# Patient Record
Sex: FEMALE | Race: WHITE
[De-identification: names, ages, dates, MRNs, and addresses within clinical notes are randomized per-mention and may not be internally consistent; named-entity substitution may affect disease eponyms.]

---

## 2017-02-17 ENCOUNTER — HOSPITAL ENCOUNTER (EMERGENCY)
Dept: HOSPITAL 53 - M ED | Age: 22
Discharge: HOME | End: 2017-02-17
Payer: COMMERCIAL

## 2017-02-17 DIAGNOSIS — R09.1: Primary | ICD-10-CM

## 2017-02-17 LAB
ANION GAP SERPL CALC-SCNC: 7 MEQ/L (ref 8–16)
BASOPHILS # BLD AUTO: 0 K/MM3 (ref 0–0.2)
BASOPHILS NFR BLD AUTO: 0.3 % (ref 0–1)
BUN SERPL-MCNC: 9 MG/DL (ref 7–18)
CALCIUM SERPL-MCNC: 9.6 MG/DL (ref 8.5–10.1)
CHLORIDE SERPL-SCNC: 107 MEQ/L (ref 98–107)
CO2 SERPL-SCNC: 28 MEQ/L (ref 21–32)
CREAT SERPL-MCNC: 0.81 MG/DL (ref 0.55–1.02)
EOSINOPHIL # BLD AUTO: 0.2 K/MM3 (ref 0–0.5)
EOSINOPHIL NFR BLD AUTO: 1.6 % (ref 0–3)
ERYTHROCYTE [DISTWIDTH] IN BLOOD BY AUTOMATED COUNT: 12.8 % (ref 11.5–14.5)
GFR SERPL CREATININE-BSD FRML MDRD: > 60 ML/MIN/{1.73_M2} (ref 60–?)
GLUCOSE SERPL-MCNC: 89 MG/DL (ref 70–105)
INR PPP: 1.08
LARGE UNSTAINED CELL #: 0.1 K/MM3 (ref 0–0.4)
LARGE UNSTAINED CELL %: 1.5 % (ref 0–4)
LYMPHOCYTES # BLD AUTO: 2 K/MM3 (ref 1.5–6.5)
LYMPHOCYTES NFR BLD AUTO: 21.8 % (ref 24–44)
MCH RBC QN AUTO: 28.7 PG (ref 27–33)
MCHC RBC AUTO-ENTMCNC: 33.8 G/DL (ref 32–36.5)
MCV RBC AUTO: 85 FL (ref 80–96)
MONOCYTES # BLD AUTO: 0.6 K/MM3 (ref 0–0.8)
MONOCYTES NFR BLD AUTO: 6.3 % (ref 0–5)
NEUTROPHILS # BLD AUTO: 6.4 K/MM3 (ref 1.8–7.7)
NEUTROPHILS NFR BLD AUTO: 68.7 % (ref 36–66)
PLATELET # BLD AUTO: 176 K/MM3 (ref 150–450)
POTASSIUM SERPL-SCNC: 3.6 MEQ/L (ref 3.5–5.1)
SODIUM SERPL-SCNC: 142 MEQ/L (ref 136–145)
WBC # BLD AUTO: 9.3 K/MM3 (ref 4–10)

## 2017-02-17 NOTE — EDDOCDS
Physician Documentation                                                                           

[facility]                                                                         

Name: Jil Saunders                                                                              

Age: 21 yrs                                                                                       

Sex: Female                                                                                       

: 1995                                                                                   

MRN: [id number]                                                                                     

Arrival Date: 2017                                                                          

Time: 20:47                                                                                       

Account#: [id number]                                                                              

Bed I7 /                                                                                        

Private MD: Vitaly Muñiz W                                                                        

Disposition:                                                                                      

17 23:37 Discharged to Home/Self Care. Impression: Chest pain on breathing, Pleurisy.       

- Condition is Stable.                                                                            

- Discharge Instructions: Nonspecific Chest Pain, Pleurisy.                                       

- Prescriptions for Ibuprofen 600 mg Oral Tablet - take 1 tablet by ORAL route every 6            

hours As needed take with food; 30 tablet.                                                      

- Medication Reconciliation, Local Pharmacy Hours form.                                           

- Follow up: Vitaly Muñiz; When: 2 - 3 days; Reason: Recheck today's complaints,                   

Continuance of care.                                                                            

- Problem is new.                                                                                 

- Symptoms are unchanged.                                                                         

                                                                                                  

                                                                                                  

                                                                                                  

Historical:                                                                                       

- Allergies: no known allergies;                                                                  

- Home Meds:                                                                                      

1. none                                                                                         

- PMHx: none;                                                                                     

- PSHx: none;                                                                                     

- Social history: Smoking status: Patient states was never smoker of tobacco. No                  

barriers to communication noted, The patient speaks fluent English, Speaks                      

appropriately for age.                                                                          

- Family history: Not pertinent.                                                                  

- : The pt / caregiver states he / she is not on anticoagulants. Home medication list             

is obtained from the patient.                                                                   

- Exposure Risk Screening:: None identified.                                                      

                                                                                                  

                                                                                                  

OB/GYN:                                                                                           

                                                                                             

20:54 LMP 2017                                                                            kc3 

                                                                                                  

Vital Signs:                                                                                      

20:49  / 98; Pulse 93; Resp 18 S; Temp 98.3(O); Pulse Ox 100% on R/A; Weight 61.23 kg / gr2 

      134.99 lbs (R); Height 5 ft. 4 in. (162.56 cm) (R); Pain 7/10;                              

23:44  / 67; Pulse 80; Resp 16; Temp 98.2; Pulse Ox 98% on R/A; Pain 4/10;              ld5 

20:49 Body Mass Index 23.17 (61.23 kg, 162.56 cm)                                             gr2 

                                                                                                  

MDM:                                                                                              

21:42 Aspirin Chewable Tablet 324 mg PO once ordered.                                         ke  

21:42 IV Saline Lock ordered.                                                                 ke  

21:42 Undress patient appropriately for examination ordered.                                  ke  

21:43 Basic Metabolic Profile Ordered.                                                        EDMS

21:43 CBC with Diff Ordered.                                                                  EDMS

21:43 Cardiac Injury Profile Ordered.                                                         EDMS

21:43 D-Dimer Quant Ordered.                                                                  EDMS

21:43 Prothrombin Time Profile\E\INR Ordered.                                                   EDMS

21:43 Troponin Ordered.                                                                       EDMS

21:45 Chest, 2 View (pa\E\lat) Ordered.                                                         EDMS

21:45 ECG WITH READING ER PHYS+CARDIAG ordered.                                               EDMS

22:45 Financial registration complete.                                                        [email or web address] 

23:06 [facility] Payment Agreement was scanned into [employer] and attached to record.               [email or web address] 

23:33 Basic Metabolic Profile Reviewed.                                                       ke  

23:33 CBC with Diff Reviewed.                                                                 ke  

23:33 Cardiac Injury Profile Reviewed.                                                        ke  

23:33 D-Dimer Quant Reviewed.                                                                 ke  

23:33 Prothrombin Time Profile\E\INR Reviewed.                                                  ke

23:33 Troponin Reviewed.                                                                      ke  

                                                                                                  

Administered Medications:                                                                         

21:50 Drug: Aspirin 324 mg [aspirin 81 mg chewable tablet (4 tabs)] Route: PO;                [facility] 

                                                                                                  

                                                                                                  

Signatures:                                                                                       

Dispatcher [employer]                           EDMS                                                 

Jonathon Allison, MEENUP                       FNP  Hermila BoboRN                      RN   ld5                                                  

Beatriz Durham RN                          RN   [facility]                                                  

Rhiannon Winters,CORIE                          RN   davy3                                                  

Margi Escobedo                                                  

                                                                                                  

The chart was reviewed and I authenticate all verbal orders and agree with the evaluation and 
treatment provided.Attachments:

23:06 [facility] Payment Agreement                                                                neda 

                                                                                                  

**************************************************************************************************

MTDD

## 2017-02-17 NOTE — EDDOCDS
Nurse's Notes                                                                                     

[facility]                                                                         

Name: Jil Saunders                                                                              

Age: 21 yrs                                                                                       

Sex: Female                                                                                       

: 1995                                                                                   

MRN: [id number]                                                                                     

Arrival Date: 2017                                                                          

Time: 20:47                                                                                       

Account#: [id number]                                                                              

Bed I7 / 29                                                                                       

Private MD: Vitaly Muñiz W                                                                        

Diagnosis: Chest pain on breathing;Pleurisy                                                       

                                                                                                  

Presentation:                                                                                     

                                                                                             

20:51 Presenting complaint: Patient states: chest pain since noon today epigastric area       kc3 

      radiating to back. Pt denies n/v and shortness of breath. Aspirin was not taken prior       

      to arrival. Adult Sepsis Screening: The patient does not have new or worsening altered      

      mentation. Patient's respiratory rate is less than 22. Systolic blood pressure is           

      greater than 100. Patient has a qSOFA score of 0- Negative Sepsis Screen.                   

      Suicide/Homicide risk assessment- the patient denies having any suicidal and/or             

      homicidal ideations and does not present with any other emotional, behavioral or mental     

      health complaints.  Status: [employer]. Transition of care: patient was not     

      received from another setting of care.                                                      

20:51 Acuity: EARNEST Level 3                                                                     kc3 

20:51 Method Of Arrival: Walkin/Carried/Asstd                                                 kc3 

                                                                                                  

Triage Assessment:                                                                                

20:53 General: Appears in no apparent distress, comfortable, Behavior is appropriate for age, kc3 

      cooperative. Pain: Location: chest Pain currently is 6 out of 10 on a pain scale. Pt        

      Declines HIV testing. Cardiovascular: Chest pain is described as Pain is 6 out of 10 on     

      a pain scale. radiates back episodes are intermittent began noon today is aggravated by     

      breathing. Respiratory: Respiratory effort is even, unlabored, Denies shortness of          

      breath. GI: Denies nausea, vomiting. Derm: Skin is pink, warm & dry.                      

                                                                                                  

OB/GYN:                                                                                           

20:54 LMP 2017                                                                            kc3 

                                                                                                  

Historical:                                                                                       

- Allergies: no known allergies;                                                                  

- Home Meds:                                                                                      

1. none                                                                                         

- PMHx: none;                                                                                     

- PSHx: none;                                                                                     

- Social history: Smoking status: Patient states was never smoker of tobacco. No                  

barriers to communication noted, The patient speaks fluent English, Speaks                      

appropriately for age.                                                                          

- Family history: Not pertinent.                                                                  

- : The pt / caregiver states he / she is not on anticoagulants. Home medication list             

is obtained from the patient.                                                                   

- Exposure Risk Screening:: None identified.                                                      

                                                                                                  

                                                                                                  

Screenin:13 Screening information is obtained from the patient. Fall risk: No risks identified.     [facility] 

      Assistance ADL's: requires no assistance with activities of daily living. Abuse/DV          

      Screen: The patient / caregiver reports he/she is: not in a situation that causes fear,     

      pain or injury. Nutritional screening: No deficits noted. Advance Directives: There is      

      no active DNR order. home support is adequate.                                              

                                                                                                  

Assessment:                                                                                       

21:56 General: Appears in no apparent distress, comfortable, Behavior is appropriate for age, cjh 

      cooperative. Pain: Location: xyphoid area Pain currently is 6 out of 10 on a pain           

      scale. Pain does not radiate. Aggravated by laying down. Neurological: Level of             

      Consciousness is awake, alert, Oriented to person, place, time. Cardiovascular: Rhythm      

      is sinus rhythm Chest pain is located in epigastric area. Respiratory: Airway is patent     

      Respiratory effort is even, unlabored, Respiratory pattern is regular, symmetrical,         

      Breath sounds are clear bilaterally. Derm: Skin is pink, warm & dry.                      

22:15 General: resting quietly on stretcher, awaiting lab results, states pain improves with  [facility] 

      sitting upright, friend at bedside, will continue to monitor.                               

23:44 General: Appears in no apparent distress, Behavior is cooperative, Pt sitting up in bed ld5 

      playing on phone. Friend at bedside. Respiratory: Airway is patent Respiratory effort       

      is even, unlabored. GI: Denies nausea, vomiting.                                            

23:44 Pain: Location: chest Pain currently is 4 out of 10 on a pain scale.                    ld5 

                                                                                                  

Vital Signs:                                                                                      

20:49  / 98; Pulse 93; Resp 18 S; Temp 98.3(O); Pulse Ox 100% on R/A; Weight 61.23 kg   gr2 

      (R); Height 5 ft. 4 in. (162.56 cm) (R); Pain 7/10;                                         

23:44  / 67; Pulse 80; Resp 16; Temp 98.2; Pulse Ox 98% on R/A; Pain 4/10;              ld5 

20:49 Body Mass Index 23.17 (61.23 kg, 162.56 cm)                                             gr2 

                                                                                                  

Vitals:                                                                                           

20:49 Log In Time: 2017 at 20:49.                                                gr2 

                                                                                                  

ED Course:                                                                                        

20:48 Patient visited by Fatuma Wood.                                                   gr2 

20:48 Patient moved to Waiting                                                                gr2 

20:49 Vitaly Muñiz is Private Physician.                                                       gr2 

20:50 Patient visited by Fatuma Wood.                                                   gr2 

20:50 Patient moved to Pre RCE                                                                gr2 

20:52 Triage Initiated                                                                        kc3 

21:31 Patient moved to Triage 3                                                               cln 

21:34 [facility]                                                              ke  

21:34 Patient visited by Jonathon Allison FNP.                                                   ke  

21:34 Patient visited by Jonathon Allison FNP.                                                   ke  

21:44 Patient moved to I                                                                cz  

22:01 Patient visited by Bancroft, Kristopher, PCA.                                           kb5 

22:01 EKG done. (by ED staff). Reviewed by Jonathon VALADEZ.                                   kb5 

22:02 Basic Metabolic Profile Sent.                                                           rw1 

22:02 CBC with Diff Sent.                                                                     rw1 

22:02 Cardiac Injury Profile Sent.                                                            rw1 

22:02 D-Dimer Quant Sent.                                                                     rw1 

22:02 Prothrombin Time Profile\E\INR Sent.                                                      rw1

22:02 Troponin Sent.                                                                          rw1 

22:02 Inserted saline lock: 18 gauge in right antecubital area and blood collected. The       rw1 

      patient tolerated the procedure well.                                                       

22:13 The patient / caregiver is instructed regarding the plan of care and ED course. Cardiac [facility] 

      monitoring not applicable on this patient.                                                  

22:13 No procedures done that require assistance.                                             [facility] 

22:22 Patient visited by Jonathon Allison FNP.                                                   ke  

23:06 [facility] Payment Agreement was scanned into [email or web address] and attached to record.               gjb 

23:07 Patient visited by Bancroft, Kristopher, PCA.                                           kb5 

23:36 Vitaly Muñiz is Referral Physician.                                                      ke  

23:44 Discontinued lock intact, bleeding controlled, pressure dressing applied, No            ld5 

      redness/swelling at site.                                                                   

23:45 Patient visited by Hermila Keller RN.                                                  ld5 

                                                                                                  

Administered Medications:                                                                         

21:50 Drug: Aspirin 324 mg [aspirin 81 mg chewable tablet (4 tabs)] Route: PO;                [facility] 

                                                                                                  

                                                                                                  

Order Results:                                                                                    

Lab Order: Basic Metabolic Profile; SPEC'M 17 22:48                                         

      Test: GLUCOSE, FASTING; Value: 89; Range: ; Units: MG/DL; Status: F                   

      Test: BLOOD UREA NITROGEN; Value: 9; Range: 7-18; Units: MG/DL; Status: F                   

      Test: CREATININE FOR GFR; Value: 0.81; Range: 0.55-1.02; Units: MG/DL; Status: F            

      Test: GLOMERULAR FILTRATION RATE; Value: > 60.0; Range: >60; Status: F                      

      Test: SODIUM LEVEL; Value: 142; Range: 136-145; Units: MEQ/L; Status: F                     

      Test: POTASSIUM SERUM; Value: 3.6; Range: 3.5-5.1; Units: MEQ/L; Status: F                  

      Test: CHLORIDE LEVEL; Value: 107; Range: ; Units: MEQ/L; Status: F                    

      Test: CARBON DIOXIDE LEVEL; Value: 28; Range: 21-32; Units: MEQ/L; Status: F                

      Test: ANION GAP; Value: 7; Range: 8-16; Abnormal: Below low normal; Units: MEQ/L;           

      Status: F                                                                                   

      Test: CALCIUM LEVEL; Value: 9.6; Range: 8.5-10.1; Units: MG/DL; Status: F                   

      Test Note: &nbsp;; Units are mL/min/1.73 m2 Chronic Kidney Disease Staging per NKF:       

      Stage I & II GFR >=60 Normal to Mildly Decreased Stage III GFR 30-59 Moderately           

      Decreased Stage IV GFR 15-29 Severely Decreased Stage V GFR <15 Very Little GFR Left        

      ESRD GFR <15 on RRT                                                                         

Lab Order: CBC with Diff; SPEC'M 17 21:59                                                   

      Test: WHITE BLOOD COUNT; Value: 9.3; Range: 4.0-10.0; Units: K/mm3; Status: F               

      Test: RED BLOOD COUNT; Value: 4.85; Range: 4.00-5.40; Units: M/mm3; Status: F               

      Test: HEMOGLOBIN; Value: 14.0; Range: 12.0-16.0; Units: g/dl; Status: F                     

      Test: HEMATOCRIT; Value: 41.2; Range: 36.0-47.0; Units: %; Status: F                        

      Test: MEAN CORPUSCULAR VOLUME; Value: 85.0; Range: 80.0-96.0; Units: fl; Status: F          

      Test: MEAN CORPUSCULAR HEMOGLOBIN; Value: 28.7; Range: 27.0-33.0; Units: pg; Status: F      

      Test: MEAN CORPUSCULAR HGB CONC; Value: 33.8; Range: 32.0-36.5; Units: g/dl; Status: F      

      Test: RED CELL DISTRIBUTION WIDTH; Value: 12.8; Range: 11.5-14.5; Units: %; Status: F       

      Test: PLATELET COUNT, AUTOMATED; Value: 176; Range: 150-450; Units: k/mm3; Status: F        

      Test: NEUTROPHILS %; Value: 68.7; Range: 36.0-66.0; Abnormal: Above high normal; Units:     

      %; Status: F                                                                                

      Test: LYMPH %; Value: 21.8; Range: 24.0-44.0; Abnormal: Below low normal; Units: %;         

      Status: F                                                                                   

      Test: MONO %; Value: 6.3; Range: 0.0-5.0; Abnormal: Above high normal; Units: %;            

      Status: F                                                                                   

      Test: EOS %; Value: 1.6; Range: 0.0-3.0; Units: %; Status: F                                

      Test: BASO %; Value: 0.3; Range: 0.0-1.0; Units: %; Status: F                               

      Test: LARGE UNSTAINED CELL %; Value: 1.5; Range: 0.0-4.0; Units: %; Status: F               

      Test: NEUTROPHILS #; Value: 6.4; Range: 1.8-7.7; Units: K/mm3; Status: F                    

      Test: LYMPH #; Value: 2.0; Range: 1.5-6.5; Units: K/mm3; Status: F                          

      Test: MONO #; Value: 0.6; Range: 0.0-0.8; Units: K/mm3; Status: F                           

      Test: EOS #; Value: 0.2; Range: 0.0-0.50; Units: K/mm3; Status: F                           

      Test: BASO #; Value: 0.0; Range: 0.0-0.2; Units: K/mm3; Status: F                           

      Test: LARGE UNSTAINED CELL #; Value: 0.1; Range: 0.0-0.4; Units: K/mm3; Status: F           

Lab Order: Cardiac Injury Profile; SPEC'M 17 22:48                                          

      Test: CPK CREATINE PHOSPHOKINASE; Value: 91; Range: ; Units: U/L; Status: F           

      Test: CK-MB VALUE MASS; Value: 2.0; Range: 0.0-3.6; Units: NG/ML; Status: F                 

      Test: MB/CK RELATIVE INDEX; Value: 2.19; Range: < OR =4; Status: F                          

      Test Note: &nbsp;; DIAGNOSIS CRITERIA MMB ng/ml Relative Index (RI) NON-AMI < or = 5      

      N/A GRAY ZONE > 5 < or = 4 AMI > 5 > 4                                                      

Lab Order: D-Dimer Quant; SPEC 17 22:48                                                   

      Test: D-DIMER QUANT; Value: 294.9; Range: <500; Units: ng/ml; Status: F                     

Lab Order: Prothrombin Time Profile\E\INR; SPEC17 22:48                                  

      Test: PROTHROMBIN TIME; Value: 14.1; Range: 12.3-14.5; Units: SECONDS; Status: F            

      Test: INR; Value: 1.08; Status: F                                                           

      Test Note: &nbsp;; THERAPUTIC HUMAN INR VALUES INDICATIONS NORMAL RANGES                  

      PROPHYLAXIS/TREATMENT OF: VENOUS THROMBOSIS 2.0-3.0 PULMONARY EMBOLISM 2.0-3.0              

      PREVENTION OF SYSTEMIC EMBOLISM FROM: TISSUE HEART VALVES 2.0-3.0 ACUTE MYOCARDIAL          

      INFARCTION 2.0-3.0 VALVULAR HEART DISEASE 2.0-3.0 ATRIAL FIBRILLATION 2.0-3.0               

      MECHANICAL VALVES(HIGH RISK) 2.5-3.5 RECURRENT MYOCARDIAL INFARCTION 2.5-3.5                

Lab Order: Troponin; SPEC 17 22:48                                                        

      Test: TROPONIN I; Value: < 0.02; Range: < 0.10; Units: NG/ML; Status: F                     

      Test Note: &nbsp;; Troponin I Reference Interval for Siem[employer] [place] LOCI: 99th             

      Percentile= 0.00-0.045 ng/ml Risk Stratification: <= 0.10 ng/ml Decreased Risk for          

      Adverse Clinical Events. 0.10-1.50 ng/ml Increased Risk for Adverse Clinical Events.        

      Evaluation of additional criterion and/or repeat testing in 2-6 hours is suggested to       

      rule out myocardial damage. >= 1.50 ng/ml Indicative of Myocardial Injury.                  

                                                                                                  

Outcome:                                                                                          

23:37 Discharge ordered by Provider.                                                            

23:44 Discharge Assessment: Patient awake, alert and oriented x 3. No cognitive and/or        ld5 

      functional deficits noted. Patient verbalized understanding of disposition                  

      instructions. patient administered narcotics - no. The following High Risk Discharge        

      criteria are identified: None. Discharged to home ambulatory, with friend. Condition:       

      stable. Discharge instructions given to patient, friend, Instructed on discharge            

      instructions, follow up and referral plans. medication usage, Demonstrated                  

      understanding of instructions, medications, Pt was receptive of discharge instructions/     

      teaching. Prescriptions given X 1. No special radiology studies were completed.             

      Property :Personal belongings accompany Pt.                                                 

23:45 Patient left the ED.                                                                    ld5 

                                                                                                  

Signatures:                                                                                       

Nikita Qiu, RN                      RN   cz                                                   

Jonathon Allison, MEENUP                       FNP  Rico Brand LPN                      LPN  rw1                                                  

Bancroft, Kristopher, PCA               PCA  kb5                                                  

Hermila KellerRN                      RN   ld5                                                  

Beatriz DurhamRN                          RN   [facility]                                                  

Fatuma Wood                            2                                                  

Rhiannon Winters,RN                          RN   kc3                                                  

Margi Escobedo Crystal, PCA                   PCA  cln                                                  

                                                                                                  

Corrections: (The following items were deleted from the chart)                                    

22:14 21:56 Respiratory: Airway is patent Respiratory effort is even, unlabored, Respiratory  cjh 

      pattern is regular, symmetrical, cjh                                                        

                                                                                                  

**************************************************************************************************

MTDD

## 2017-02-18 NOTE — REP
Clinical:  Chest pain .

 

Comparison: None .

 

Technique:  PA and lateral.

 

Findings:

The mediastinum and cardiac silhouette are normal.  The lung fields are clear and

without acute consolidation, effusion, or pneumothorax.  The skeletal structures

are intact and normal.

 

Impression:

1.   No acute cardiopulmonary process.

 

 

Signed by

Néstor Jorge MD 02/18/2017 08:14 A

## 2017-02-18 NOTE — ECGEPIP
Stationary ECG Study

                           [facility] - ED

                                       

                                       Test Date:    2017

Pat Name:     DIEUDONNE WOMACK           Department:   

Patient ID:   [id number]                 Room:         -

Gender:       F                        Technician:   CYNDEE

:          1995               Requested By: CIARA VALADEZ

Order Number: [id number]     Reading MD:   Patricai Phillips

                                 Measurements

Intervals                              Axis          

Rate:         89                       P:            -1

NE:           126                      QRS:          69

QRSD:         85                       T:            31

QT:           353                                    

QTc:          429                                    

                           Interpretive Statements

SINUS RHYTHM WITH SINUS ARRHYTHMIA

NO PRIOR FOR COMPARISON

Electronically Signed On 2017 19:36:06 EST by Patricia Phillips

## 2017-02-20 NOTE — EDDOCDS
Physician Documentation                                                                           

[facility]                                                                         

Name: Jil Saunders                                                                              

Age: 21 yrs                                                                                       

Sex: Female                                                                                       

: 1995                                                                                   

MRN: [id number]                                                                                     

Arrival Date: 2017                                                                          

Time: 20:47                                                                                       

Account#: [id number]                                                                              

Bed I7 /                                                                                        

Private MD: Vitaly Muñiz W                                                                        

Disposition:                                                                                      

17 23:37 Discharged to Home/Self Care. Impression: Chest pain on breathing, Pleurisy.       

- Condition is Stable.                                                                            

- Discharge Instructions: Nonspecific Chest Pain, Pleurisy.                                       

- Prescriptions for Ibuprofen 600 mg Oral Tablet - take 1 tablet by ORAL route every 6            

hours As needed take with food; 30 tablet.                                                      

- Medication Reconciliation, Local Pharmacy Hours form.                                           

- Follow up: Vitaly Muñiz; When: 2 - 3 days; Reason: Recheck today's complaints,                   

Continuance of care.                                                                            

- Problem is new.                                                                                 

- Symptoms are unchanged.                                                                         

                                                                                                  

                                                                                                  

                                                                                                  

Historical:                                                                                       

- Allergies: no known allergies;                                                                  

- Home Meds:                                                                                      

1. none                                                                                         

- PMHx: none;                                                                                     

- PSHx: none;                                                                                     

- Social history: Smoking status: Patient states was never smoker of tobacco. No                  

barriers to communication noted, The patient speaks fluent English, Speaks                      

appropriately for age.                                                                          

- Family history: Not pertinent.                                                                  

- : The pt / caregiver states he / she is not on anticoagulants. Home medication list             

is obtained from the patient.                                                                   

- Exposure Risk Screening:: None identified.                                                      

                                                                                                  

                                                                                                  

OB/GYN:                                                                                           

                                                                                             

20:54 LMP 2017                                                                            kc3 

                                                                                                  

Vital Signs:                                                                                      

20:49  / 98; Pulse 93; Resp 18 S; Temp 98.3(O); Pulse Ox 100% on R/A; Weight 61.23 kg / gr2 

      134.99 lbs (R); Height 5 ft. 4 in. (162.56 cm) (R); Pain 7/10;                              

23:44  / 67; Pulse 80; Resp 16; Temp 98.2; Pulse Ox 98% on R/A; Pain 4/10;              ld5 

20:49 Body Mass Index 23.17 (61.23 kg, 162.56 cm)                                             gr2 

                                                                                                  

MDM:                                                                                              

21:42 Aspirin Chewable Tablet 324 mg PO once ordered.                                         ke  

21:42 IV Saline Lock ordered.                                                                 ke  

21:42 Undress patient appropriately for examination ordered.                                  ke  

21:43 Basic Metabolic Profile Ordered.                                                        EDMS

21:43 CBC with Diff Ordered.                                                                  EDMS

21:43 Cardiac Injury Profile Ordered.                                                         EDMS

21:43 D-Dimer Quant Ordered.                                                                  EDMS

21:43 Prothrombin Time Profile\E\INR Ordered.                                                   EDMS

21:43 Troponin Ordered.                                                                       EDMS

21:45 Chest, 2 View (pa\E\lat) Ordered.                                                         EDMS

21:45 ECG WITH READING ER PHYS+CARDIAG ordered.                                               EDMS

22:45 Financial registration complete.                                                        gjb 

23:06 [facility] Payment Agreement was scanned into [employer] and attached to record.               gjb 

23:33 Basic Metabolic Profile Reviewed.                                                       ke  

23:33 CBC with Diff Reviewed.                                                                 ke  

23:33 Cardiac Injury Profile Reviewed.                                                        ke  

23:33 D-Dimer Quant Reviewed.                                                                 ke  

23:33 Prothrombin Time Profile\E\INR Reviewed.                                                  ke

23:33 Troponin Reviewed.                                                                      ke  

                                                                                             

10:44 T-Sheet-- Draft Copy was scanned into [employer] and attached to record.                   gb  

10:44 ECG/EKG was scanned into [employer] and attached to record.                                gb  

                                                                                                  

Administered Medications:                                                                         

                                                                                             

21:50 Drug: Aspirin 324 mg [aspirin 81 mg chewable tablet (4 tabs)] Route: PO;                [facility] 

                                                                                                  

                                                                                                  

Signatures:                                                                                       

Dispatcher [employer]                           EDMS                                                 

Tena Argueta, Reg                  Reg  gb                                                   

oJnathon Allison, FNP                       FNP  Hermila BoboRN                      RN   ld5                                                  

Beatriz Durham RN                          RN   [facility]                                                  

Rhiannon Winters,CORIE                          RN   kc3                                                  

Margi Escobedo                                                  

                                                                                                  

The chart was reviewed and I authenticate all verbal orders and agree with the evaluation and 
treatment provided.Attachments:

23:06 [facility] Payment Agreement                                                                [facility] 

                                                                                             

10:44 T-Sheet-- Draft Copy                                                                    gb  

10:44 ECG/EKG                                                                                 gb  

                                                                                                  

**************************************************************************************************



*** Chart Complete ***
MTDD

## 2017-02-20 NOTE — EDDOCDS
Physician Documentation                                                                           

[facility]                                                                         

Name: Jil Saunders                                                                              

Age: 21 yrs                                                                                       

Sex: Female                                                                                       

: 1995                                                                                   

MRN: [id number]                                                                                     

Arrival Date: 2017                                                                          

Time: 20:47                                                                                       

Account#: [id number]                                                                              

Bed I7 /                                                                                        

Private MD: Vitaly Muñiz W                                                                        

Disposition:                                                                                      

17 23:37 Discharged to Home/Self Care. Impression: Chest pain on breathing, Pleurisy.       

- Condition is Stable.                                                                            

- Discharge Instructions: Nonspecific Chest Pain, Pleurisy.                                       

- Prescriptions for Ibuprofen 600 mg Oral Tablet - take 1 tablet by ORAL route every 6            

hours As needed take with food; 30 tablet.                                                      

- Medication Reconciliation, Local Pharmacy Hours form.                                           

- Follow up: Vitaly Muñiz; When: 2 - 3 days; Reason: Recheck today's complaints,                   

Continuance of care.                                                                            

- Problem is new.                                                                                 

- Symptoms are unchanged.                                                                         

                                                                                                  

                                                                                                  

                                                                                                  

Historical:                                                                                       

- Allergies: no known allergies;                                                                  

- Home Meds:                                                                                      

1. none                                                                                         

- PMHx: none;                                                                                     

- PSHx: none;                                                                                     

- Social history: Smoking status: Patient states was never smoker of tobacco. No                  

barriers to communication noted, The patient speaks fluent English, Speaks                      

appropriately for age.                                                                          

- Family history: Not pertinent.                                                                  

- : The pt / caregiver states he / she is not on anticoagulants. Home medication list             

is obtained from the patient.                                                                   

- Exposure Risk Screening:: None identified.                                                      

                                                                                                  

                                                                                                  

OB/GYN:                                                                                           

                                                                                             

20:54 LMP 2017                                                                            kc3 

                                                                                                  

Vital Signs:                                                                                      

20:49  / 98; Pulse 93; Resp 18 S; Temp 98.3(O); Pulse Ox 100% on R/A; Weight 61.23 kg / gr2 

      134.99 lbs (R); Height 5 ft. 4 in. (162.56 cm) (R); Pain 7/10;                              

23:44  / 67; Pulse 80; Resp 16; Temp 98.2; Pulse Ox 98% on R/A; Pain 4/10;              ld5 

20:49 Body Mass Index 23.17 (61.23 kg, 162.56 cm)                                             gr2 

                                                                                                  

MDM:                                                                                              

21:42 Aspirin Chewable Tablet 324 mg PO once ordered.                                         ke  

21:42 IV Saline Lock ordered.                                                                 ke  

21:42 Undress patient appropriately for examination ordered.                                  ke  

21:43 Basic Metabolic Profile Ordered.                                                        EDMS

21:43 CBC with Diff Ordered.                                                                  EDMS

21:43 Cardiac Injury Profile Ordered.                                                         EDMS

21:43 D-Dimer Quant Ordered.                                                                  EDMS

21:43 Prothrombin Time Profile\E\INR Ordered.                                                   EDMS

21:43 Troponin Ordered.                                                                       EDMS

21:45 Chest, 2 View (pa\E\lat) Ordered.                                                         EDMS

21:45 ECG WITH READING ER PHYS+CARDIAG ordered.                                               EDMS

22:45 Financial registration complete.                                                        gjb 

23:06 [facility] Payment Agreement was scanned into [employer] and attached to record.               gjb 

23:33 Basic Metabolic Profile Reviewed.                                                       ke  

23:33 CBC with Diff Reviewed.                                                                 ke  

23:33 Cardiac Injury Profile Reviewed.                                                        ke  

23:33 D-Dimer Quant Reviewed.                                                                 ke  

23:33 Prothrombin Time Profile\E\INR Reviewed.                                                  ke

23:33 Troponin Reviewed.                                                                      ke  

                                                                                             

10:44 T-Sheet-- Draft Copy was scanned into [employer] and attached to record.                   gb  

10:44 ECG/EKG was scanned into [employer] and attached to record.                                gb  

                                                                                                  

Administered Medications:                                                                         

                                                                                             

21:50 Drug: Aspirin 324 mg [aspirin 81 mg chewable tablet (4 tabs)] Route: PO;                [facility] 

                                                                                                  

                                                                                                  

Signatures:                                                                                       

Dispatcher [employer]                           EDMS                                                 

Tena Argueta, Reg                  Reg  gb                                                   

Jonathon Allison, FNP                       FNP  Hermila BoboRN                      RN   ld5                                                  

Beatriz Durham RN                          RN   [facility]                                                  

Rhiannon Winters,CORIE                          RN   kc3                                                  

Margi Escobedo                                                  

                                                                                                  

The chart was reviewed and I authenticate all verbal orders and agree with the evaluation and 
treatment provided.Attachments:

23:06 [facility] Payment Agreement                                                                [facility] 

                                                                                             

10:44 T-Sheet-- Draft Copy                                                                    gb  

10:44 ECG/EKG                                                                                 gb  

                                                                                                  

**************************************************************************************************



*** Chart Complete ***
MTDD

## 2017-02-20 NOTE — EDDOCDS
Nurse's Notes                                                                                     

[facility]                                                                         

Name: Jil Saunders                                                                              

Age: 21 yrs                                                                                       

Sex: Female                                                                                       

: 1995                                                                                   

MRN: [id number]                                                                                     

Arrival Date: 2017                                                                          

Time: 20:47                                                                                       

Account#: [id number]                                                                              

Bed I7 / 29                                                                                       

Private MD: Vitaly Muñiz W                                                                        

Diagnosis: Chest pain on breathing;Pleurisy                                                       

                                                                                                  

Presentation:                                                                                     

                                                                                             

20:51 Presenting complaint: Patient states: chest pain since noon today epigastric area       kc3 

      radiating to back. Pt denies n/v and shortness of breath. Aspirin was not taken prior       

      to arrival. Adult Sepsis Screening: The patient does not have new or worsening altered      

      mentation. Patient's respiratory rate is less than 22. Systolic blood pressure is           

      greater than 100. Patient has a qSOFA score of 0- Negative Sepsis Screen.                   

      Suicide/Homicide risk assessment- the patient denies having any suicidal and/or             

      homicidal ideations and does not present with any other emotional, behavioral or mental     

      health complaints.  Status: [employer]. Transition of care: patient was not     

      received from another setting of care.                                                      

20:51 Acuity: EARNEST Level 3                                                                     kc3 

20:51 Method Of Arrival: Walkin/Carried/Asstd                                                 kc3 

                                                                                                  

Triage Assessment:                                                                                

20:53 General: Appears in no apparent distress, comfortable, Behavior is appropriate for age, kc3 

      cooperative. Pain: Location: chest Pain currently is 6 out of 10 on a pain scale. Pt        

      Declines HIV testing. Cardiovascular: Chest pain is described as Pain is 6 out of 10 on     

      a pain scale. radiates back episodes are intermittent began noon today is aggravated by     

      breathing. Respiratory: Respiratory effort is even, unlabored, Denies shortness of          

      breath. GI: Denies nausea, vomiting. Derm: Skin is pink, warm & dry.                      

                                                                                                  

OB/GYN:                                                                                           

20:54 LMP 2017                                                                            kc3 

                                                                                                  

Historical:                                                                                       

- Allergies: no known allergies;                                                                  

- Home Meds:                                                                                      

1. none                                                                                         

- PMHx: none;                                                                                     

- PSHx: none;                                                                                     

- Social history: Smoking status: Patient states was never smoker of tobacco. No                  

barriers to communication noted, The patient speaks fluent English, Speaks                      

appropriately for age.                                                                          

- Family history: Not pertinent.                                                                  

- : The pt / caregiver states he / she is not on anticoagulants. Home medication list             

is obtained from the patient.                                                                   

- Exposure Risk Screening:: None identified.                                                      

                                                                                                  

                                                                                                  

Screenin:13 Screening information is obtained from the patient. Fall risk: No risks identified.     [facility] 

      Assistance ADL's: requires no assistance with activities of daily living. Abuse/DV          

      Screen: The patient / caregiver reports he/she is: not in a situation that causes fear,     

      pain or injury. Nutritional screening: No deficits noted. Advance Directives: There is      

      no active DNR order. home support is adequate.                                              

                                                                                                  

Assessment:                                                                                       

21:56 General: Appears in no apparent distress, comfortable, Behavior is appropriate for age, cjh 

      cooperative. Pain: Location: xyphoid area Pain currently is 6 out of 10 on a pain           

      scale. Pain does not radiate. Aggravated by laying down. Neurological: Level of             

      Consciousness is awake, alert, Oriented to person, place, time. Cardiovascular: Rhythm      

      is sinus rhythm Chest pain is located in epigastric area. Respiratory: Airway is patent     

      Respiratory effort is even, unlabored, Respiratory pattern is regular, symmetrical,         

      Breath sounds are clear bilaterally. Derm: Skin is pink, warm & dry.                      

22:15 General: resting quietly on stretcher, awaiting lab results, states pain improves with  [facility] 

      sitting upright, friend at bedside, will continue to monitor.                               

23:44 General: Appears in no apparent distress, Behavior is cooperative, Pt sitting up in bed ld5 

      playing on phone. Friend at bedside. Respiratory: Airway is patent Respiratory effort       

      is even, unlabored. GI: Denies nausea, vomiting.                                            

23:44 Pain: Location: chest Pain currently is 4 out of 10 on a pain scale.                    ld5 

                                                                                                  

Vital Signs:                                                                                      

20:49  / 98; Pulse 93; Resp 18 S; Temp 98.3(O); Pulse Ox 100% on R/A; Weight 61.23 kg   gr2 

      (R); Height 5 ft. 4 in. (162.56 cm) (R); Pain 7/10;                                         

23:44  / 67; Pulse 80; Resp 16; Temp 98.2; Pulse Ox 98% on R/A; Pain 4/10;              ld5 

20:49 Body Mass Index 23.17 (61.23 kg, 162.56 cm)                                             gr2 

                                                                                                  

Vitals:                                                                                           

20:49 Log In Time: 2017 at 20:49.                                                gr2 

                                                                                                  

ED Course:                                                                                        

20:48 Patient visited by Fatuma Wood.                                                   gr2 

20:48 Patient moved to Waiting                                                                gr2 

20:49 Vitaly Muñiz is Private Physician.                                                       gr2 

20:50 Patient visited by Fatuma Wood.                                                   gr2 

20:50 Patient moved to Pre RCE                                                                gr2 

20:52 Triage Initiated                                                                        kc3 

21:31 Patient moved to Triage 3                                                               cln 

21:34 [facility]                                                              ke  

21:34 Patient visited by Jonathon Allison FNP.                                                   ke  

21:34 Patient visited by Jonathon Allison FNP.                                                   ke  

21:44 Patient moved to I                                                                cz  

22:01 Patient visited by Bancroft, Kristopher, PCA.                                           kb5 

22:01 EKG done. (by ED staff). Reviewed by Jonathon VALADEZ.                                   kb5 

22:02 Basic Metabolic Profile Sent.                                                           rw1 

22:02 CBC with Diff Sent.                                                                     rw1 

22:02 Cardiac Injury Profile Sent.                                                            rw1 

22:02 D-Dimer Quant Sent.                                                                     rw1 

22:02 Prothrombin Time Profile\E\INR Sent.                                                      rw1

22:02 Troponin Sent.                                                                          rw1 

22:02 Inserted saline lock: 18 gauge in right antecubital area and blood collected. The       rw1 

      patient tolerated the procedure well.                                                       

22:13 The patient / caregiver is instructed regarding the plan of care and ED course. Cardiac [facility] 

      monitoring not applicable on this patient.                                                  

22:13 No procedures done that require assistance.                                             [facility] 

22:22 Patient visited by Jonathon Allison FNP.                                                   ke  

23:06 [facility] Payment Agreement was scanned into [employer] and attached to record.               gjb 

23:07 Patient visited by Bancroft, Kristopher, PCA.                                           kb5 

23:36 Vitaly Muñiz is Referral Physician.                                                      ke  

23:44 Discontinued lock intact, bleeding controlled, pressure dressing applied, No            ld5 

      redness/swelling at site.                                                                   

23:45 Patient visited by Hermila Keller RN.                                                  ld5 

                                                                                             

08:46 Chest, 2 View (pa\E\lat) Returned.                                                        EDMS

10:44 T-Sheet-- Draft Copy was scanned into [employer] and attached to record.                   gb  

10:44 ECG/EKG was scanned into [employer] and attached to record.                                gb  

20:06 EKG-ADULT Returned.                                                                     EDMS

                                                                                                  

Administered Medications:                                                                         

                                                                                             

21:50 Drug: Aspirin 324 mg [aspirin 81 mg chewable tablet (4 tabs)] Route: PO;                [facility] 

                                                                                                  

                                                                                                  

Order Results:                                                                                    

Lab Order: Basic Metabolic Profile; SPEC'M 17 22:48                                         

      Test: GLUCOSE, FASTING; Value: 89; Range: ; Units: MG/DL; Status: F                   

      Test: BLOOD UREA NITROGEN; Value: 9; Range: 7-18; Units: MG/DL; Status: F                   

      Test: CREATININE FOR GFR; Value: 0.81; Range: 0.55-1.02; Units: MG/DL; Status: F            

      Test: GLOMERULAR FILTRATION RATE; Value: > 60.0; Range: >60; Status: F                      

      Test: SODIUM LEVEL; Value: 142; Range: 136-145; Units: MEQ/L; Status: F                     

      Test: POTASSIUM SERUM; Value: 3.6; Range: 3.5-5.1; Units: MEQ/L; Status: F                  

      Test: CHLORIDE LEVEL; Value: 107; Range: ; Units: MEQ/L; Status: F                    

      Test: CARBON DIOXIDE LEVEL; Value: 28; Range: 21-32; Units: MEQ/L; Status: F                

      Test: ANION GAP; Value: 7; Range: 8-16; Abnormal: Below low normal; Units: MEQ/L;           

      Status: F                                                                                   

      Test: CALCIUM LEVEL; Value: 9.6; Range: 8.5-10.1; Units: MG/DL; Status: F                   

      Test Note: &nbsp;; Units are mL/min/1.73 m2 Chronic Kidney Disease Staging per NKF:       

      Stage I & II GFR >=60 Normal to Mildly Decreased Stage III GFR 30-59 Moderately           

      Decreased Stage IV GFR 15-29 Severely Decreased Stage V GFR <15 Very Little GFR Left        

      ESRD GFR <15 on RRT                                                                         

Lab Order: CBC with Diff; SPEC'17 21:59                                                   

      Test: WHITE BLOOD COUNT; Value: 9.3; Range: 4.0-10.0; Units: K/mm3; Status: F               

      Test: RED BLOOD COUNT; Value: 4.85; Range: 4.00-5.40; Units: M/mm3; Status: F               

      Test: HEMOGLOBIN; Value: 14.0; Range: 12.0-16.0; Units: g/dl; Status: F                     

      Test: HEMATOCRIT; Value: 41.2; Range: 36.0-47.0; Units: %; Status: F                        

      Test: MEAN CORPUSCULAR VOLUME; Value: 85.0; Range: 80.0-96.0; Units: fl; Status: F          

      Test: MEAN CORPUSCULAR HEMOGLOBIN; Value: 28.7; Range: 27.0-33.0; Units: pg; Status: F      

      Test: MEAN CORPUSCULAR HGB CONC; Value: 33.8; Range: 32.0-36.5; Units: g/dl; Status: F      

      Test: RED CELL DISTRIBUTION WIDTH; Value: 12.8; Range: 11.5-14.5; Units: %; Status: F       

      Test: PLATELET COUNT, AUTOMATED; Value: 176; Range: 150-450; Units: k/mm3; Status: F        

      Test: NEUTROPHILS %; Value: 68.7; Range: 36.0-66.0; Abnormal: Above high normal; Units:     

      %; Status: F                                                                                

      Test: LYMPH %; Value: 21.8; Range: 24.0-44.0; Abnormal: Below low normal; Units: %;         

      Status: F                                                                                   

      Test: MONO %; Value: 6.3; Range: 0.0-5.0; Abnormal: Above high normal; Units: %;            

      Status: F                                                                                   

      Test: EOS %; Value: 1.6; Range: 0.0-3.0; Units: %; Status: F                                

      Test: BASO %; Value: 0.3; Range: 0.0-1.0; Units: %; Status: F                               

      Test: LARGE UNSTAINED CELL %; Value: 1.5; Range: 0.0-4.0; Units: %; Status: F               

      Test: NEUTROPHILS #; Value: 6.4; Range: 1.8-7.7; Units: K/mm3; Status: F                    

      Test: LYMPH #; Value: 2.0; Range: 1.5-6.5; Units: K/mm3; Status: F                          

      Test: MONO #; Value: 0.6; Range: 0.0-0.8; Units: K/mm3; Status: F                           

      Test: EOS #; Value: 0.2; Range: 0.0-0.50; Units: K/mm3; Status: F                           

      Test: BASO #; Value: 0.0; Range: 0.0-0.2; Units: K/mm3; Status: F                           

      Test: LARGE UNSTAINED CELL #; Value: 0.1; Range: 0.0-0.4; Units: K/mm3; Status: F           

Lab Order: Cardiac Injury Profile; SPEC17 22:48                                          

      Test: CPK CREATINE PHOSPHOKINASE; Value: 91; Range: ; Units: U/L; Status: F           

      Test: CK-MB VALUE MASS; Value: 2.0; Range: 0.0-3.6; Units: NG/ML; Status: F                 

      Test: MB/CK RELATIVE INDEX; Value: 2.19; Range: < OR =4; Status: F                          

      Test Note: &nbsp;; DIAGNOSIS CRITERIA MMB ng/ml Relative Index (RI) NON-AMI < or = 5      

      N/A GRAY ZONE > 5 < or = 4 AMI > 5 > 4                                                      

Lab Order: D-Dimer Quant; SPEC'M 02/17/17 22:48                                                   

      Test: D-DIMER QUANT; Value: 294.9; Range: <500; Units: ng/ml; Status: F                     

Lab Order: Prothrombin Time Profile\E\INR; SPEC'M 02/17/17 22:48                                  

      Test: PROTHROMBIN TIME; Value: 14.1; Range: 12.3-14.5; Units: SECONDS; Status: F            

      Test: INR; Value: 1.08; Status: F                                                           

      Test Note: &nbsp;; THERAPUTIC HUMAN INR VALUES INDICATIONS NORMAL RANGES                  

      PROPHYLAXIS/TREATMENT OF: VENOUS THROMBOSIS 2.0-3.0 PULMONARY EMBOLISM 2.0-3.0              

      PREVENTION OF SYSTEMIC EMBOLISM FROM: TISSUE HEART VALVES 2.0-3.0 ACUTE MYOCARDIAL          

      INFARCTION 2.0-3.0 VALVULAR HEART DISEASE 2.0-3.0 ATRIAL FIBRILLATION 2.0-3.0               

      MECHANICAL VALVES(HIGH RISK) 2.5-3.5 RECURRENT MYOCARDIAL INFARCTION 2.5-3.5                

Lab Order: Troponin; SPEC'M 02/17/17 22:48                                                        

      Test: TROPONIN I; Value: < 0.02; Range: < 0.10; Units: NG/ML; Status: F                     

      Test Note: &nbsp;; Troponin I Reference Interval for [employer] [place] LOCI: 99th             

      Percentile= 0.00-0.045 ng/ml Risk Stratification: <= 0.10 ng/ml Decreased Risk for          

      Adverse Clinical Events. 0.10-1.50 ng/ml Increased Risk for Adverse Clinical Events.        

      Evaluation of additional criterion and/or repeat testing in 2-6 hours is suggested to       

      rule out myocardial damage. >= 1.50 ng/ml Indicative of Myocardial Injury.                  

                                                                                                  

Radiology Order: Chest, 2 View (pa\E\lat)                                                         

      Test: Chest, 2 View (pa\E\lat)                                                              

      REASON FOR EXAMINATION: Chest Pain; Clinical: Chest pain .; ; Comparison: None .; ;         

      Technique: PA and lateral.; ; Findings:; The mediastinum and cardiac silhouette are         

      normal. The lung fields are clear and; without acute consolidation, effusion, or            

      pneumothorax. The skeletal structures; are intact and normal.; ; Impression:; 1. No         

      acute cardiopulmonary process.; ; ; Signed by; Néstor Jorge MD 2017 08:14 A;        

Radiology Order: EKG-ADULT                                                                        

      Test: EKG-ADULT                                                                             

      REASON FOR EXAMINATION: Chest Pain; Stationary ECG Study; [facility] - ED; ;        

      Test Date: 2017; Pat Name: JIL SAUNDERS Department:; Patient ID: [id number] Room:     

      -; Gender: F Technician: CYNDEE; : 1995 Requested By: JONATHON VALADEZ; Order        

      Number: [id number] Reading MD: Patricia Phillips; Measurements; Intervals     

      Axis; Rate: 89 P: -1; KS: 126 QRS: 69; QRSD: 85 T: 31; QT: 353; QTc: 429; Interpretive      

      Statements; SINUS RHYTHM WITH SINUS ARRHYTHMIA; NO PRIOR FOR COMPARISON; Electronically     

      Signed On 2017 19:36:06 EST by Patricia Phillips;                                  

Outcome:                                                                                          

23:37 Discharge ordered by Provider.                                                          ke  

23:44 Discharge Assessment: Patient awake, alert and oriented x 3. No cognitive and/or        ld5 

      functional deficits noted. Patient verbalized understanding of disposition                  

      instructions. patient administered narcotics - no. The following High Risk Discharge        

      criteria are identified: None. Discharged to home ambulatory, with friend. Condition:       

      stable. Discharge instructions given to patient, friend, Instructed on discharge            

      instructions, follow up and referral plans. medication usage, Demonstrated                  

      understanding of instructions, medications, Pt was receptive of discharge instructions/     

      teaching. Prescriptions given X 1. No special radiology studies were completed.             

      Property :Personal belongings accompany Pt.                                                 

23:45 Patient left the ED.                                                                    ld5 

                                                                                                  

Signatures:                                                                                       

Dispatcher [employer]                           EDMS                                                 

Nikita Qiu, RN                      RN   cz                                                   

Ellie, Tena, Reg                  Reg  gb                                                   

Jonathon Allison, FNP                       FNP  ke                                                   

Rico Chen,LPN                      LPN  rw1                                                  

Bancroft, Kristopher, PCA               PCA  kb5                                                  

Hermila Keller,CORIE                      RN   ld5                                                  

Beatriz DurhamRN                          RN   cj                                                  

Fatuma Wood                            gr2                                                  

Rhiannon Winters RN                          RN   kc3                                                  

Margi Escobedob                                                  

Tammi Kebede, PCA                   PCA  cln                                                  

                                                                                                  

Corrections: (The following items were deleted from the chart)                                    

22:14 21:56 Respiratory: Airway is patent Respiratory effort is even, unlabored, Respiratory  cjh 

      pattern is regular, symmetrical, cjh                                                        

                                                                                                  

**************************************************************************************************



*** Chart Complete ***
MTDD

## 2017-08-23 ENCOUNTER — HOSPITAL ENCOUNTER (OUTPATIENT)
Dept: HOSPITAL 53 - M WUC | Age: 22
End: 2017-08-23
Attending: PHYSICIAN ASSISTANT
Payer: COMMERCIAL

## 2017-08-23 DIAGNOSIS — M79.641: Primary | ICD-10-CM

## 2017-08-24 NOTE — REP
REASON: Pain.

 

PRIORS: None.

 

FINDINGS:

 

The joint spaces are symmetric and relatively well maintained.  There is no

evidence of acute fracture or destructive osseous lesion.

 

IMPRESSION:

 

Negative hand.

 

 

 

 

Signed by

Teja Osborn DO 08/24/2017 02:40 P

## 2018-09-18 ENCOUNTER — HOSPITAL ENCOUNTER (OUTPATIENT)
Dept: HOSPITAL 53 - M WUC | Age: 23
End: 2018-09-18
Attending: INTERNAL MEDICINE
Payer: COMMERCIAL

## 2018-09-18 DIAGNOSIS — R00.2: Primary | ICD-10-CM

## 2018-09-18 DIAGNOSIS — I10: ICD-10-CM

## 2018-09-18 LAB
ANION GAP: 7 MEQ/L (ref 8–16)
APPEARANCE, URINE: CLEAR
BACTERIA UR QL AUTO: NEGATIVE
BILIRUBIN, URINE AUTO: NEGATIVE
BLOOD UREA NITROGEN: 11 MG/DL (ref 7–18)
BLOOD, URINE BLOOD: (no result)
CALCIUM LEVEL: 8.9 MG/DL (ref 8.5–10.1)
CARBON DIOXIDE LEVEL: 25 MEQ/L (ref 21–32)
CHLORIDE LEVEL: 110 MEQ/L (ref 98–107)
CREATININE FOR GFR: 0.71 MG/DL (ref 0.55–1.3)
GFR SERPL CREATININE-BSD FRML MDRD: > 60 ML/MIN/{1.73_M2} (ref 60–?)
GLUCOSE, FASTING: 88 MG/DL (ref 70–100)
GLUCOSE, URINE (UA) AUTO: NEGATIVE MG/DL
HEMATOCRIT: 40 % (ref 36–47)
HEMOGLOBIN: 12.9 G/DL (ref 12–15.5)
KETONE, URINE AUTO: NEGATIVE MG/DL
LEUKOCYTE ESTERASE UR QL STRIP.AUTO: NEGATIVE
MEAN CORPUSCULAR HEMOGLOBIN: 28.7 PG (ref 27–33)
MEAN CORPUSCULAR HGB CONC: 32.3 G/DL (ref 32–36.5)
MEAN CORPUSCULAR VOLUME: 89.1 FL (ref 80–96)
NITRITE, URINE AUTO: NEGATIVE
NRBC BLD AUTO-RTO: 0 % (ref 0–0)
PH,URINE: 5 UNITS (ref 5–9)
PLATELET COUNT, AUTOMATED: 161 10^3/UL (ref 150–450)
POTASSIUM SERUM: 4.5 MEQ/L (ref 3.5–5.1)
PROT UR QL STRIP.AUTO: NEGATIVE MG/DL
RBC, URINE AUTO: 6 /HPF (ref 0–3)
RED BLOOD COUNT: 4.49 10^6/UL (ref 4–5.4)
RED CELL DISTRIBUTION WIDTH: 13 % (ref 11.5–14.5)
SODIUM LEVEL: 142 MEQ/L (ref 136–145)
SPECIFIC GRAVITY URINE AUTO: 1.02 (ref 1–1.03)
SQUAMOUS #/AREA URNS AUTO: 2 /HPF (ref 0–6)
THYROID STIMULATING HORMONE: 1.6 UIU/ML (ref 0.36–3.74)
UROBILINOGEN, URINE AUTO: 0.2 MG/DL (ref 0–2)
WBC, URINE AUTO: 1 /HPF (ref 0–3)
WHITE BLOOD COUNT: 7.2 10^3/UL (ref 4–10)

## 2018-09-18 PROCEDURE — 84443 ASSAY THYROID STIM HORMONE: CPT
